# Patient Record
Sex: FEMALE | Race: WHITE | NOT HISPANIC OR LATINO | Employment: OTHER | URBAN - NONMETROPOLITAN AREA
[De-identification: names, ages, dates, MRNs, and addresses within clinical notes are randomized per-mention and may not be internally consistent; named-entity substitution may affect disease eponyms.]

---

## 2017-06-05 ENCOUNTER — OFFICE VISIT (OUTPATIENT)
Dept: GASTROENTEROLOGY | Facility: CLINIC | Age: 67
End: 2017-06-05

## 2017-06-05 VITALS
BODY MASS INDEX: 27.94 KG/M2 | DIASTOLIC BLOOD PRESSURE: 73 MMHG | SYSTOLIC BLOOD PRESSURE: 143 MMHG | OXYGEN SATURATION: 92 % | WEIGHT: 178 LBS | HEART RATE: 69 BPM | HEIGHT: 67 IN

## 2017-06-05 DIAGNOSIS — R10.84 GENERALIZED ABDOMINAL PAIN: Primary | ICD-10-CM

## 2017-06-05 DIAGNOSIS — Z12.11 COLON CANCER SCREENING: ICD-10-CM

## 2017-06-05 DIAGNOSIS — R11.2 NAUSEA AND VOMITING, INTRACTABILITY OF VOMITING NOT SPECIFIED, UNSPECIFIED VOMITING TYPE: ICD-10-CM

## 2017-06-05 PROCEDURE — 99204 OFFICE O/P NEW MOD 45 MIN: CPT | Performed by: INTERNAL MEDICINE

## 2017-06-05 RX ORDER — LEVOTHYROXINE SODIUM 0.1 MG/1
100 TABLET ORAL DAILY
COMMUNITY

## 2017-06-26 RX ORDER — DULOXETIN HYDROCHLORIDE 60 MG/1
60 CAPSULE, DELAYED RELEASE ORAL DAILY
COMMUNITY

## 2017-06-26 RX ORDER — BACLOFEN 10 MG/1
10 TABLET ORAL 3 TIMES DAILY
COMMUNITY

## 2017-06-26 RX ORDER — ATENOLOL 50 MG/1
50 TABLET ORAL DAILY
COMMUNITY

## 2017-06-26 RX ORDER — HYDROCODONE BITARTRATE AND ACETAMINOPHEN 10; 325 MG/1; MG/1
1 TABLET ORAL EVERY 6 HOURS PRN
COMMUNITY

## 2017-06-26 RX ORDER — HYDROXYZINE HYDROCHLORIDE 10 MG/1
10 TABLET, FILM COATED ORAL 3 TIMES DAILY PRN
COMMUNITY

## 2017-06-26 RX ORDER — DEXTROAMPHETAMINE SULFATE 30 MG/1
TABLET ORAL
COMMUNITY

## 2017-06-26 RX ORDER — LISINOPRIL 10 MG/1
10 TABLET ORAL DAILY
COMMUNITY

## 2017-06-26 RX ORDER — AMLODIPINE BESYLATE 5 MG/1
5 TABLET ORAL DAILY
COMMUNITY

## 2017-06-26 RX ORDER — ESCITALOPRAM OXALATE 10 MG/1
10 TABLET ORAL DAILY
COMMUNITY

## 2017-07-11 ENCOUNTER — HOSPITAL ENCOUNTER (OUTPATIENT)
Facility: HOSPITAL | Age: 67
Setting detail: HOSPITAL OUTPATIENT SURGERY
Discharge: HOME | End: 2017-07-11
Attending: INTERNAL MEDICINE | Admitting: INTERNAL MEDICINE

## 2017-07-11 ENCOUNTER — ANESTHESIA (OUTPATIENT)
Dept: PERIOP | Facility: HOSPITAL | Age: 67
End: 2017-07-11

## 2017-07-11 ENCOUNTER — ANESTHESIA EVENT (OUTPATIENT)
Dept: PERIOP | Facility: HOSPITAL | Age: 67
End: 2017-07-11

## 2017-07-11 VITALS
TEMPERATURE: 97 F | SYSTOLIC BLOOD PRESSURE: 159 MMHG | DIASTOLIC BLOOD PRESSURE: 77 MMHG | BODY MASS INDEX: 25.58 KG/M2 | RESPIRATION RATE: 20 BRPM | WEIGHT: 163 LBS | HEIGHT: 67 IN | OXYGEN SATURATION: 96 % | HEART RATE: 58 BPM

## 2017-07-11 DIAGNOSIS — R11.2 NAUSEA AND VOMITING, INTRACTABILITY OF VOMITING NOT SPECIFIED, UNSPECIFIED VOMITING TYPE: ICD-10-CM

## 2017-07-11 DIAGNOSIS — Z12.11 COLON CANCER SCREENING: ICD-10-CM

## 2017-07-11 DIAGNOSIS — R10.84 GENERALIZED ABDOMINAL PAIN: ICD-10-CM

## 2017-07-11 PROCEDURE — 25010000002 FENTANYL CITRATE (PF) 100 MCG/2ML SOLUTION: Performed by: NURSE ANESTHETIST, CERTIFIED REGISTERED

## 2017-07-11 PROCEDURE — 25010000002 MIDAZOLAM PER 1 MG: Performed by: NURSE ANESTHETIST, CERTIFIED REGISTERED

## 2017-07-11 PROCEDURE — 25010000002 PROPOFOL 10 MG/ML EMULSION: Performed by: NURSE ANESTHETIST, CERTIFIED REGISTERED

## 2017-07-11 PROCEDURE — 45385 COLONOSCOPY W/LESION REMOVAL: CPT | Performed by: INTERNAL MEDICINE

## 2017-07-11 PROCEDURE — 88305 TISSUE EXAM BY PATHOLOGIST: CPT | Performed by: INTERNAL MEDICINE

## 2017-07-11 PROCEDURE — 43239 EGD BIOPSY SINGLE/MULTIPLE: CPT | Performed by: INTERNAL MEDICINE

## 2017-07-11 PROCEDURE — 25010000002 PROPOFOL 1000 MG/ML EMULSION: Performed by: NURSE ANESTHETIST, CERTIFIED REGISTERED

## 2017-07-11 RX ORDER — PROPOFOL 10 MG/ML
VIAL (ML) INTRAVENOUS AS NEEDED
Status: DISCONTINUED | OUTPATIENT
Start: 2017-07-11 | End: 2017-07-11 | Stop reason: SURG

## 2017-07-11 RX ORDER — IPRATROPIUM BROMIDE AND ALBUTEROL SULFATE 2.5; .5 MG/3ML; MG/3ML
3 SOLUTION RESPIRATORY (INHALATION) ONCE AS NEEDED
Status: DISCONTINUED | OUTPATIENT
Start: 2017-07-11 | End: 2017-07-11 | Stop reason: HOSPADM

## 2017-07-11 RX ORDER — SODIUM CHLORIDE, SODIUM LACTATE, POTASSIUM CHLORIDE, CALCIUM CHLORIDE 600; 310; 30; 20 MG/100ML; MG/100ML; MG/100ML; MG/100ML
125 INJECTION, SOLUTION INTRAVENOUS CONTINUOUS
Status: DISCONTINUED | OUTPATIENT
Start: 2017-07-11 | End: 2017-07-11 | Stop reason: HOSPADM

## 2017-07-11 RX ORDER — SODIUM CHLORIDE 0.9 % (FLUSH) 0.9 %
1-10 SYRINGE (ML) INJECTION AS NEEDED
Status: DISCONTINUED | OUTPATIENT
Start: 2017-07-11 | End: 2017-07-11 | Stop reason: HOSPADM

## 2017-07-11 RX ORDER — FENTANYL CITRATE 50 UG/ML
INJECTION, SOLUTION INTRAMUSCULAR; INTRAVENOUS AS NEEDED
Status: DISCONTINUED | OUTPATIENT
Start: 2017-07-11 | End: 2017-07-11 | Stop reason: SURG

## 2017-07-11 RX ORDER — MIDAZOLAM HYDROCHLORIDE 1 MG/ML
INJECTION INTRAMUSCULAR; INTRAVENOUS AS NEEDED
Status: DISCONTINUED | OUTPATIENT
Start: 2017-07-11 | End: 2017-07-11 | Stop reason: SURG

## 2017-07-11 RX ORDER — ONDANSETRON 2 MG/ML
4 INJECTION INTRAMUSCULAR; INTRAVENOUS ONCE AS NEEDED
Status: DISCONTINUED | OUTPATIENT
Start: 2017-07-11 | End: 2017-07-11 | Stop reason: HOSPADM

## 2017-07-11 RX ORDER — OXYCODONE HYDROCHLORIDE AND ACETAMINOPHEN 5; 325 MG/1; MG/1
1 TABLET ORAL ONCE AS NEEDED
Status: DISCONTINUED | OUTPATIENT
Start: 2017-07-11 | End: 2017-07-11 | Stop reason: HOSPADM

## 2017-07-11 RX ORDER — FENTANYL CITRATE 50 UG/ML
50 INJECTION, SOLUTION INTRAMUSCULAR; INTRAVENOUS
Status: DISCONTINUED | OUTPATIENT
Start: 2017-07-11 | End: 2017-07-11 | Stop reason: HOSPADM

## 2017-07-11 RX ORDER — LIDOCAINE HYDROCHLORIDE 20 MG/ML
INJECTION, SOLUTION INFILTRATION; PERINEURAL AS NEEDED
Status: DISCONTINUED | OUTPATIENT
Start: 2017-07-11 | End: 2017-07-11 | Stop reason: SURG

## 2017-07-11 RX ORDER — MEPERIDINE HYDROCHLORIDE 25 MG/ML
12.5 INJECTION INTRAMUSCULAR; INTRAVENOUS; SUBCUTANEOUS
Status: DISCONTINUED | OUTPATIENT
Start: 2017-07-11 | End: 2017-07-11 | Stop reason: HOSPADM

## 2017-07-11 RX ADMIN — PROPOFOL 20 MG: 10 INJECTION, EMULSION INTRAVENOUS at 09:43

## 2017-07-11 RX ADMIN — LIDOCAINE HYDROCHLORIDE 60 MG: 20 INJECTION, SOLUTION INFILTRATION; PERINEURAL at 09:43

## 2017-07-11 RX ADMIN — MIDAZOLAM HYDROCHLORIDE 2 MG: 1 INJECTION, SOLUTION INTRAMUSCULAR; INTRAVENOUS at 09:39

## 2017-07-11 RX ADMIN — SODIUM CHLORIDE, POTASSIUM CHLORIDE, SODIUM LACTATE AND CALCIUM CHLORIDE 125 ML/HR: 600; 310; 30; 20 INJECTION, SOLUTION INTRAVENOUS at 08:27

## 2017-07-11 RX ADMIN — PROPOFOL 140 MCG/KG/MIN: 10 INJECTION, EMULSION INTRAVENOUS at 09:43

## 2017-07-11 RX ADMIN — FENTANYL CITRATE 100 MCG: 50 INJECTION INTRAMUSCULAR; INTRAVENOUS at 09:39

## 2017-07-12 ENCOUNTER — TELEPHONE (OUTPATIENT)
Dept: GASTROENTEROLOGY | Facility: CLINIC | Age: 67
End: 2017-07-12

## 2017-07-12 DIAGNOSIS — R10.84 GENERALIZED ABDOMINAL PAIN: Primary | ICD-10-CM

## 2017-07-12 LAB
LAB AP CASE REPORT: NORMAL
Lab: NORMAL
PATH REPORT.FINAL DX SPEC: NORMAL

## 2017-09-18 ENCOUNTER — TELEPHONE (OUTPATIENT)
Dept: GASTROENTEROLOGY | Facility: CLINIC | Age: 67
End: 2017-09-18

## 2017-09-18 DIAGNOSIS — R10.84 GENERALIZED ABDOMINAL PAIN: Primary | ICD-10-CM

## 2017-09-19 ENCOUNTER — TELEPHONE (OUTPATIENT)
Dept: GASTROENTEROLOGY | Facility: CLINIC | Age: 67
End: 2017-09-19

## 2017-09-19 ENCOUNTER — HOSPITAL ENCOUNTER (OUTPATIENT)
Dept: ULTRASOUND IMAGING | Facility: HOSPITAL | Age: 67
Discharge: HOME | End: 2017-09-19
Attending: INTERNAL MEDICINE | Admitting: INTERNAL MEDICINE

## 2017-09-19 DIAGNOSIS — R93.5 ABNORMAL ULTRASOUND OF ABDOMEN: Primary | ICD-10-CM

## 2017-09-19 PROCEDURE — 76700 US EXAM ABDOM COMPLETE: CPT | Performed by: RADIOLOGY

## 2017-09-19 PROCEDURE — 76700 US EXAM ABDOM COMPLETE: CPT

## 2017-09-22 ENCOUNTER — LAB (OUTPATIENT)
Dept: LAB | Facility: HOSPITAL | Age: 67
End: 2017-09-22
Attending: INTERNAL MEDICINE

## 2017-09-22 DIAGNOSIS — R93.5 ABNORMAL ULTRASOUND OF ABDOMEN: ICD-10-CM

## 2017-09-22 LAB
ALBUMIN SERPL-MCNC: 4.3 G/DL (ref 3.4–4.8)
ALP SERPL-CCNC: 96 U/L (ref 35–104)
ALT SERPL W P-5'-P-CCNC: 14 U/L (ref 10–36)
AST SERPL-CCNC: 19 U/L (ref 10–30)
BILIRUB CONJ SERPL-MCNC: 0 MG/DL (ref 0–0.2)
BILIRUB INDIRECT SERPL-MCNC: 0.3 MG/DL
BILIRUB SERPL-MCNC: 0.3 MG/DL (ref 0.2–1.8)
PROT SERPL-MCNC: 6.8 G/DL (ref 6–8)

## 2017-09-22 PROCEDURE — 80076 HEPATIC FUNCTION PANEL: CPT | Performed by: INTERNAL MEDICINE

## 2021-02-17 DIAGNOSIS — Z23 IMMUNIZATION DUE: ICD-10-CM

## 2025-05-13 PROBLEM — I65.29 CAROTID STENOSIS: Status: ACTIVE | Noted: 2025-05-13

## 2025-05-13 PROBLEM — I10 ESSENTIAL HYPERTENSION: Status: ACTIVE | Noted: 2025-05-13

## 2025-05-13 NOTE — PROGRESS NOTES
"[facility]  Cardiology Consultation Note     Stephanie Loza  1950  Requesting Provider: ISABEL Brannon  PCP: Flores Del Castillo APRN    ID:  Stephanie Loza is a 74 y.o. female who resides in [place], [place].     REASON FOR CONSULTATION:    Hypertension         Dear Flores:    Thank you for referring Stephanie Loza to my office today for hypertension.  She actually thought she was here today to talk about her peripheral arterial disease and right leg claudication.  She has had multiple procedures performed in her distal aorta and right leg initially by Dr. Nehemiah Pastrana and most recently by Dr. العلي at [facility].  She states that she has had recurrence of claudication symptoms in her right leg identical to what she was experiencing prior to previous right lower extremity revascularization procedure.    The patient states her blood pressure is typically \"good\" but does not check it at home.  She is presently on lisinopril HCTZ, amlodipine, and atenolol.    He is a non-smoker.  She is compliant with statin therapy and her recent lipid profile was excellent.      Past Medical History, Past Surgical History, Family history, Social History, and Medications were all reviewed with the patient today and updated as necessary.       Current Outpatient Medications:     amLODIPine (NORVASC) 5 MG tablet, Take 1 tablet by mouth Daily., Disp: , Rfl:     atenolol (TENORMIN) 50 MG tablet, Take 1 tablet by mouth Daily., Disp: , Rfl:     butalbital-acetaminophen-caffeine (FIORICET, ESGIC) -40 MG per tablet, (-40 MG), Disp: , Rfl:     clonazePAM (KlonoPIN) 0.5 MG tablet, Take 1 tablet by mouth Daily., Disp: , Rfl:     clopidogrel (PLAVIX) 75 MG tablet, Take 1 tablet by mouth Daily., Disp: , Rfl:     DULoxetine (CYMBALTA) 60 MG capsule, Take 1 capsule by mouth Daily., Disp: , Rfl:     estradiol (ESTRACE) 0.5 MG tablet, Take 1 tablet by mouth Daily., Disp: , Rfl:     " "HYDROcodone-acetaminophen (NORCO)  MG per tablet, Take 1 tablet by mouth Every 6 (Six) Hours As Needed for Moderate Pain., Disp: , Rfl:     levothyroxine (SYNTHROID, LEVOTHROID) 100 MCG tablet, Take 1 tablet by mouth Daily., Disp: , Rfl:     lisinopril-hydrochlorothiazide (PRINZIDE,ZESTORETIC) 20-25 MG per tablet, Take 1 tablet by mouth Daily., Disp: , Rfl:     rosuvastatin (CRESTOR) 20 MG tablet, Take 1 tablet by mouth Daily., Disp: , Rfl:     Allergies   Allergen Reactions    Iodinated Contrast Media Hives         Past Medical History:   Diagnosis Date    Arthritis     Brain condition     Coronary artery disease     Disease of thyroid gland     Fibromyalgia     Headache     Hypertension        Past Surgical History:   Procedure Laterality Date    CHOLECYSTECTOMY      COLONOSCOPY N/A 7/11/2017    Procedure: COLONOSCOPY  CPTCODE:92778;  Surgeon: Phong Montoya III, MD;  Location: [facility];  Service:     ENDOSCOPY N/A 7/11/2017    Procedure: ESOPHAGOGASTRODUODENOSCOPY WITH BIOPSY  CPTCODE:78643;  Surgeon: Phong Montoya III, MD;  Location: [facility];  Service:     FEMORAL ARTERY STENT      HYSTERECTOMY         Family History   Problem Relation Age of Onset    Heart disease Other     Diabetes Other     Cancer Other        Social History     Tobacco Use    Smoking status: Never    Smokeless tobacco: Not on file   Substance Use Topics    Alcohol use: No       Review of Systems   Cardiovascular:  Positive for claudication. Negative for chest pain and dyspnea on exertion.               /70   Pulse 59   Ht 167.6 cm (66\")   Wt 75.3 kg (166 lb)   SpO2 95%   BMI 26.79 kg/m²        Constitutional:       Appearance: Healthy appearance.   Eyes:      General: No scleral icterus.  Neck:      Thyroid: No thyroid mass.      Vascular: No carotid bruit or JVD. JVD normal.   Pulmonary:      Effort: Pulmonary effort is normal.      Breath sounds: Normal breath sounds.   Cardiovascular:      Normal rate. " Regular rhythm.      Murmurs: There is no murmur.      No gallop.    Edema:     Peripheral edema absent.   Skin:     General: Skin is warm. There is no cyanosis.   Neurological:      General: No focal deficit present.      Mental Status: Alert.   Psychiatric:         Attention and Perception: Attention normal.             ECG 12 Lead    Date/Time: 5/14/2025 2:49 PM  Performed by: James Galvez IV, MD    Authorized by: James Galvez IV, MD  Comparison: not compared with previous ECG   Previous ECG: no previous ECG available  Rhythm: sinus rhythm  BPM: 59  Other findings: non-specific ST-T wave changes    Clinical impression: non-specific ECG          Labs (4/22/2025):    HA1C 5.7  Chol 132, Trig 385, HDL 31, LDL 23    Labs (2/26/2024):    Glucose 91, creat 1.3, BUN 32, Na 137, K 3.8, AST 16, ALT 15         Diagnoses and all orders for this visit:    1. Essential hypertension (Primary)  Overview:  Target blood pressure <130/80 mmHg    Assessment & Plan:  Well-controlled  Continue lisinopril-HCTZ, amlodipine, and atenolol    Orders:  -     amLODIPine (NORVASC) 5 MG tablet; Take 1 tablet by mouth Daily.  -     atenolol (TENORMIN) 50 MG tablet; Take 1 tablet by mouth Daily.  -     lisinopril-hydrochlorothiazide (PRINZIDE,ZESTORETIC) 20-25 MG per tablet; Take 1 tablet by mouth Daily.    2. PAD (peripheral artery disease)  Overview:  Previous intervention by Dr. Pastrana and then [facility] -- Dr. العلي    Assessment & Plan:  Recurrent right lower extremity claudication symptoms  I recommend patient follow-up with Dr. العلي, who has previously cared for her peripheral arterial disease  Continue antiplatelet, high intensity statin    Orders:  -     clopidogrel (PLAVIX) 75 MG tablet; Take 1 tablet by mouth Daily.    3. Bilateral carotid artery stenosis  Overview:  Carotid duplex (7/20/2023): ARELIS 50-69% stenosis. LICA 50-69% stenosis.     Assessment & Plan:  Asymptomatic  Continue clopidogrel  and high intensity statin    Orders:  -     clopidogrel (PLAVIX) 75 MG tablet; Take 1 tablet by mouth Daily.    4. Hyperlipidemia LDL goal <50  Assessment & Plan:  LDL well-controlled  Continue rosuvastatin    Orders:  -     rosuvastatin (CRESTOR) 20 MG tablet; Take 1 tablet by mouth Daily.                 Continue present medical therapy  Patient should follow-up with Dr. العلي at [facility] for right leg claudication  Return in about 1 year (around 5/14/2026).        RUSTY Galvez MD, FAC, [facility]  Interventional Cardiology  05/14/25  14:41 EDT

## 2025-05-14 ENCOUNTER — OFFICE VISIT (OUTPATIENT)
Dept: CARDIOLOGY | Facility: CLINIC | Age: 75
End: 2025-05-14
Payer: MEDICARE

## 2025-05-14 VITALS
SYSTOLIC BLOOD PRESSURE: 130 MMHG | HEIGHT: 66 IN | BODY MASS INDEX: 26.68 KG/M2 | HEART RATE: 59 BPM | WEIGHT: 166 LBS | DIASTOLIC BLOOD PRESSURE: 70 MMHG | OXYGEN SATURATION: 95 %

## 2025-05-14 DIAGNOSIS — I73.9 PAD (PERIPHERAL ARTERY DISEASE): ICD-10-CM

## 2025-05-14 DIAGNOSIS — I10 ESSENTIAL HYPERTENSION: Primary | ICD-10-CM

## 2025-05-14 DIAGNOSIS — E78.5 HYPERLIPIDEMIA LDL GOAL <50: ICD-10-CM

## 2025-05-14 DIAGNOSIS — I65.23 BILATERAL CAROTID ARTERY STENOSIS: ICD-10-CM

## 2025-05-14 PROCEDURE — 1159F MED LIST DOCD IN RCRD: CPT | Performed by: INTERNAL MEDICINE

## 2025-05-14 PROCEDURE — 3078F DIAST BP <80 MM HG: CPT | Performed by: INTERNAL MEDICINE

## 2025-05-14 PROCEDURE — 1160F RVW MEDS BY RX/DR IN RCRD: CPT | Performed by: INTERNAL MEDICINE

## 2025-05-14 PROCEDURE — 3075F SYST BP GE 130 - 139MM HG: CPT | Performed by: INTERNAL MEDICINE

## 2025-05-14 RX ORDER — BUTALBITAL, ACETAMINOPHEN AND CAFFEINE 50; 325; 40 MG/1; MG/1; MG/1
TABLET ORAL
COMMUNITY
Start: 2024-12-03

## 2025-05-14 RX ORDER — CLOPIDOGREL BISULFATE 75 MG/1
75 TABLET ORAL DAILY
COMMUNITY
Start: 2025-04-22 | End: 2025-05-14 | Stop reason: SDUPTHER

## 2025-05-14 RX ORDER — AMLODIPINE BESYLATE 5 MG/1
5 TABLET ORAL DAILY
Start: 2025-05-14

## 2025-05-14 RX ORDER — CLONAZEPAM 0.5 MG/1
0.5 TABLET ORAL DAILY
COMMUNITY
Start: 2025-04-22 | End: 2025-05-20

## 2025-05-14 RX ORDER — ATENOLOL 50 MG/1
50 TABLET ORAL DAILY
Start: 2025-05-14

## 2025-05-14 RX ORDER — LISINOPRIL 10 MG/1
10 TABLET ORAL DAILY
Status: CANCELLED
Start: 2025-05-14

## 2025-05-14 RX ORDER — CLOPIDOGREL BISULFATE 75 MG/1
75 TABLET ORAL DAILY
Start: 2025-05-14

## 2025-05-14 RX ORDER — ROSUVASTATIN CALCIUM 20 MG/1
20 TABLET, COATED ORAL DAILY
COMMUNITY
End: 2025-05-14 | Stop reason: SDUPTHER

## 2025-05-14 RX ORDER — LISINOPRIL AND HYDROCHLOROTHIAZIDE 20; 25 MG/1; MG/1
1 TABLET ORAL DAILY
Start: 2025-05-14

## 2025-05-14 RX ORDER — ROSUVASTATIN CALCIUM 20 MG/1
20 TABLET, COATED ORAL DAILY
Start: 2025-05-14

## 2025-05-14 RX ORDER — LISINOPRIL AND HYDROCHLOROTHIAZIDE 20; 25 MG/1; MG/1
1 TABLET ORAL DAILY
COMMUNITY
End: 2025-05-14 | Stop reason: SDUPTHER

## 2025-05-14 RX ORDER — ESTRADIOL 0.5 MG/1
0.5 TABLET ORAL DAILY
COMMUNITY
Start: 2025-04-22 | End: 2025-07-21

## 2025-05-14 NOTE — LETTER
"May 14, 2025     ISABEL Brannon  [address]  [facility] [address]    Patient: Stephanie Loza   YOB: 1950   Date of Visit: 5/14/2025     Dear ISABEL Brannon:       Thank you for referring Stephanie Loza to me for evaluation. Below are the relevant portions of my assessment and plan of care.    If you have questions, please do not hesitate to call me. I look forward to following Stephanie along with you.         Sincerely,        James Galvez IV, MD        CC: No Recipients    James Galvez IV, MD  05/14/25 1449  Addendum  [facility]  Cardiology Consultation Note     Stephanie Loza  1950  Requesting Provider: ISABEL Brannon  PCP: Flores Del Castillo APRN    ID:  Stephanie Loza is a 74 y.o. female who resides in [place], [place].     REASON FOR CONSULTATION:    Hypertension         Dear Flores:    Thank you for referring Stephanie Loza to my office today for hypertension.  She actually thought she was here today to talk about her peripheral arterial disease and right leg claudication.  She has had multiple procedures performed in her distal aorta and right leg initially by Dr. Nehemiah Pastrana and most recently by Dr. العلي at [facility].  She states that she has had recurrence of claudication symptoms in her right leg identical to what she was experiencing prior to previous right lower extremity revascularization procedure.    The patient states her blood pressure is typically \"good\" but does not check it at home.  She is presently on lisinopril HCTZ, amlodipine, and atenolol.    He is a non-smoker.  She is compliant with statin therapy and her recent lipid profile was excellent.      Past Medical History, Past Surgical History, Family history, Social History, and Medications were all reviewed with the patient today and updated as necessary.       Current Outpatient Medications:   •  amLODIPine (NORVASC) 5 MG " tablet, Take 1 tablet by mouth Daily., Disp: , Rfl:   •  atenolol (TENORMIN) 50 MG tablet, Take 1 tablet by mouth Daily., Disp: , Rfl:   •  butalbital-acetaminophen-caffeine (FIORICET, ESGIC) -40 MG per tablet, (-40 MG), Disp: , Rfl:   •  clonazePAM (KlonoPIN) 0.5 MG tablet, Take 1 tablet by mouth Daily., Disp: , Rfl:   •  clopidogrel (PLAVIX) 75 MG tablet, Take 1 tablet by mouth Daily., Disp: , Rfl:   •  DULoxetine (CYMBALTA) 60 MG capsule, Take 1 capsule by mouth Daily., Disp: , Rfl:   •  estradiol (ESTRACE) 0.5 MG tablet, Take 1 tablet by mouth Daily., Disp: , Rfl:   •  HYDROcodone-acetaminophen (NORCO)  MG per tablet, Take 1 tablet by mouth Every 6 (Six) Hours As Needed for Moderate Pain., Disp: , Rfl:   •  levothyroxine (SYNTHROID, LEVOTHROID) 100 MCG tablet, Take 1 tablet by mouth Daily., Disp: , Rfl:   •  lisinopril-hydrochlorothiazide (PRINZIDE,ZESTORETIC) 20-25 MG per tablet, Take 1 tablet by mouth Daily., Disp: , Rfl:   •  rosuvastatin (CRESTOR) 20 MG tablet, Take 1 tablet by mouth Daily., Disp: , Rfl:     Allergies   Allergen Reactions   • Iodinated Contrast Media Hives         Past Medical History:   Diagnosis Date   • Arthritis    • Brain condition    • Coronary artery disease    • Disease of thyroid gland    • Fibromyalgia    • Headache    • Hypertension        Past Surgical History:   Procedure Laterality Date   • CHOLECYSTECTOMY     • COLONOSCOPY N/A 7/11/2017    Procedure: COLONOSCOPY  CPTCODE:34918;  Surgeon: Phong Montoya III, MD;  Location: [facility] OR;  Service:    • ENDOSCOPY N/A 7/11/2017    Procedure: ESOPHAGOGASTRODUODENOSCOPY WITH BIOPSY  CPTCODE:78800;  Surgeon: Phong Montoya III, MD;  Location: [facility] OR;  Service:    • FEMORAL ARTERY STENT     • HYSTERECTOMY         Family History   Problem Relation Age of Onset   • Heart disease Other    • Diabetes Other    • Cancer Other        Social History     Tobacco Use   • Smoking status: Never   • Smokeless tobacco:  "Not on file   Substance Use Topics   • Alcohol use: No       Review of Systems   Cardiovascular:  Positive for claudication. Negative for chest pain and dyspnea on exertion.               /70   Pulse 59   Ht 167.6 cm (66\")   Wt 75.3 kg (166 lb)   SpO2 95%   BMI 26.79 kg/m²        Constitutional:       Appearance: Healthy appearance.   Eyes:      General: No scleral icterus.  Neck:      Thyroid: No thyroid mass.      Vascular: No carotid bruit or JVD. JVD normal.   Pulmonary:      Effort: Pulmonary effort is normal.      Breath sounds: Normal breath sounds.   Cardiovascular:      Normal rate. Regular rhythm.      Murmurs: There is no murmur.      No gallop.    Edema:     Peripheral edema absent.   Skin:     General: Skin is warm. There is no cyanosis.   Neurological:      General: No focal deficit present.      Mental Status: Alert.   Psychiatric:         Attention and Perception: Attention normal.             ECG 12 Lead    Date/Time: 5/14/2025 2:49 PM  Performed by: James Galvez IV, MD    Authorized by: James Galvez IV, MD  Comparison: not compared with previous ECG   Previous ECG: no previous ECG available  Rhythm: sinus rhythm  BPM: 59  Other findings: non-specific ST-T wave changes    Clinical impression: non-specific ECG          Labs (4/22/2025):    HA1C 5.7  Chol 132, Trig 385, HDL 31, LDL 23    Labs (2/26/2024):    Glucose 91, creat 1.3, BUN 32, Na 137, K 3.8, AST 16, ALT 15         Diagnoses and all orders for this visit:    1. Essential hypertension (Primary)  Overview:  Target blood pressure <130/80 mmHg    Assessment & Plan:  Well-controlled  Continue lisinopril-HCTZ, amlodipine, and atenolol    Orders:  -     amLODIPine (NORVASC) 5 MG tablet; Take 1 tablet by mouth Daily.  -     atenolol (TENORMIN) 50 MG tablet; Take 1 tablet by mouth Daily.  -     lisinopril-hydrochlorothiazide (PRINZIDE,ZESTORETIC) 20-25 MG per tablet; Take 1 tablet by mouth Daily.    2. PAD " (peripheral artery disease)  Overview:  Previous intervention by Dr. Pastrana and then [facility] -- Dr. العلي    Assessment & Plan:  Recurrent right lower extremity claudication symptoms  I recommend patient follow-up with Dr. العلي, who has previously cared for her peripheral arterial disease  Continue antiplatelet, high intensity statin    Orders:  -     clopidogrel (PLAVIX) 75 MG tablet; Take 1 tablet by mouth Daily.    3. Bilateral carotid artery stenosis  Overview:  Carotid duplex (7/20/2023): ARELIS 50-69% stenosis. LICA 50-69% stenosis.     Assessment & Plan:  Asymptomatic  Continue clopidogrel and high intensity statin    Orders:  -     clopidogrel (PLAVIX) 75 MG tablet; Take 1 tablet by mouth Daily.    4. Hyperlipidemia LDL goal <50  Assessment & Plan:  LDL well-controlled  Continue rosuvastatin    Orders:  -     rosuvastatin (CRESTOR) 20 MG tablet; Take 1 tablet by mouth Daily.                 Continue present medical therapy  Patient should follow-up with Dr. العلي at [facility] for right leg claudication  Return in about 1 year (around 5/14/2026).        RUSTY Galvez MD, FAC, [facility]  Interventional Cardiology  05/14/25  14:41 EDT

## 2025-05-14 NOTE — ASSESSMENT & PLAN NOTE
Recurrent right lower extremity claudication symptoms  I recommend patient follow-up with Dr. العلي, who has previously cared for her peripheral arterial disease  Continue antiplatelet, high intensity statin

## 2025-05-14 NOTE — LETTER
"May 14, 2025     ISABEL Brannon  [address]  [facility] [address]    Patient: Stephanie Loza   YOB: 1950   Date of Visit: 5/14/2025     Dear ISABEL Brannon:       Thank you for referring Stephanie Loza to me for evaluation. Below are the relevant portions of my assessment and plan of care.    If you have questions, please do not hesitate to call me. I look forward to following Stephanie along with you.         Sincerely,        James Galvez IV, MD        CC: No Recipients    James Galvez IV, MD  05/14/25 1449  Addendum  [facility]  Cardiology Consultation Note     Stephanie Loza  1950  Requesting Provider: ISABEL Brannon  PCP: Flores Del Castillo APRN    ID:  Stephanie Loza is a 74 y.o. female who resides in [place], [place].     REASON FOR CONSULTATION:    Hypertension         Dear Flores:    Thank you for referring Stephanie Loza to my office today for hypertension.  She actually thought she was here today to talk about her peripheral arterial disease and right leg claudication.  She has had multiple procedures performed in her distal aorta and right leg initially by Dr. Nehemiah Pastrana and most recently by Dr. العلي at [facility].  She states that she has had recurrence of claudication symptoms in her right leg identical to what she was experiencing prior to previous right lower extremity revascularization procedure.    The patient states her blood pressure is typically \"good\" but does not check it at home.  She is presently on lisinopril HCTZ, amlodipine, and atenolol.    He is a non-smoker.  She is compliant with statin therapy and her recent lipid profile was excellent.      Past Medical History, Past Surgical History, Family history, Social History, and Medications were all reviewed with the patient today and updated as necessary.       Current Outpatient Medications:   •  amLODIPine (NORVASC) 5 MG " tablet, Take 1 tablet by mouth Daily., Disp: , Rfl:   •  atenolol (TENORMIN) 50 MG tablet, Take 1 tablet by mouth Daily., Disp: , Rfl:   •  butalbital-acetaminophen-caffeine (FIORICET, ESGIC) -40 MG per tablet, (-40 MG), Disp: , Rfl:   •  clonazePAM (KlonoPIN) 0.5 MG tablet, Take 1 tablet by mouth Daily., Disp: , Rfl:   •  clopidogrel (PLAVIX) 75 MG tablet, Take 1 tablet by mouth Daily., Disp: , Rfl:   •  DULoxetine (CYMBALTA) 60 MG capsule, Take 1 capsule by mouth Daily., Disp: , Rfl:   •  estradiol (ESTRACE) 0.5 MG tablet, Take 1 tablet by mouth Daily., Disp: , Rfl:   •  HYDROcodone-acetaminophen (NORCO)  MG per tablet, Take 1 tablet by mouth Every 6 (Six) Hours As Needed for Moderate Pain., Disp: , Rfl:   •  levothyroxine (SYNTHROID, LEVOTHROID) 100 MCG tablet, Take 1 tablet by mouth Daily., Disp: , Rfl:   •  lisinopril-hydrochlorothiazide (PRINZIDE,ZESTORETIC) 20-25 MG per tablet, Take 1 tablet by mouth Daily., Disp: , Rfl:   •  rosuvastatin (CRESTOR) 20 MG tablet, Take 1 tablet by mouth Daily., Disp: , Rfl:     Allergies   Allergen Reactions   • Iodinated Contrast Media Hives         Past Medical History:   Diagnosis Date   • Arthritis    • Brain condition    • Coronary artery disease    • Disease of thyroid gland    • Fibromyalgia    • Headache    • Hypertension        Past Surgical History:   Procedure Laterality Date   • CHOLECYSTECTOMY     • COLONOSCOPY N/A 7/11/2017    Procedure: COLONOSCOPY  CPTCODE:51673;  Surgeon: Phong Montoya III, MD;  Location: [facility] OR;  Service:    • ENDOSCOPY N/A 7/11/2017    Procedure: ESOPHAGOGASTRODUODENOSCOPY WITH BIOPSY  CPTCODE:78360;  Surgeon: Phong Montoya III, MD;  Location: [facility] OR;  Service:    • FEMORAL ARTERY STENT     • HYSTERECTOMY         Family History   Problem Relation Age of Onset   • Heart disease Other    • Diabetes Other    • Cancer Other        Social History     Tobacco Use   • Smoking status: Never   • Smokeless tobacco:  "Not on file   Substance Use Topics   • Alcohol use: No       Review of Systems   Cardiovascular:  Positive for claudication. Negative for chest pain and dyspnea on exertion.               /70   Pulse 59   Ht 167.6 cm (66\")   Wt 75.3 kg (166 lb)   SpO2 95%   BMI 26.79 kg/m²        Constitutional:       Appearance: Healthy appearance.   Eyes:      General: No scleral icterus.  Neck:      Thyroid: No thyroid mass.      Vascular: No carotid bruit or JVD. JVD normal.   Pulmonary:      Effort: Pulmonary effort is normal.      Breath sounds: Normal breath sounds.   Cardiovascular:      Normal rate. Regular rhythm.      Murmurs: There is no murmur.      No gallop.    Edema:     Peripheral edema absent.   Skin:     General: Skin is warm. There is no cyanosis.   Neurological:      General: No focal deficit present.      Mental Status: Alert.   Psychiatric:         Attention and Perception: Attention normal.             ECG 12 Lead    Date/Time: 5/14/2025 2:49 PM  Performed by: James Galvez IV, MD    Authorized by: James Galvez IV, MD  Comparison: not compared with previous ECG   Previous ECG: no previous ECG available  Rhythm: sinus rhythm  BPM: 59  Other findings: non-specific ST-T wave changes    Clinical impression: non-specific ECG          Labs (4/22/2025):    HA1C 5.7  Chol 132, Trig 385, HDL 31, LDL 23    Labs (2/26/2024):    Glucose 91, creat 1.3, BUN 32, Na 137, K 3.8, AST 16, ALT 15         Diagnoses and all orders for this visit:    1. Essential hypertension (Primary)  Overview:  Target blood pressure <130/80 mmHg    Assessment & Plan:  Well-controlled  Continue lisinopril-HCTZ, amlodipine, and atenolol    Orders:  -     amLODIPine (NORVASC) 5 MG tablet; Take 1 tablet by mouth Daily.  -     atenolol (TENORMIN) 50 MG tablet; Take 1 tablet by mouth Daily.  -     lisinopril-hydrochlorothiazide (PRINZIDE,ZESTORETIC) 20-25 MG per tablet; Take 1 tablet by mouth Daily.    2. PAD " (peripheral artery disease)  Overview:  Previous intervention by Dr. Pastrana and then [facility] -- Dr. العلي    Assessment & Plan:  Recurrent right lower extremity claudication symptoms  I recommend patient follow-up with Dr. العلي, who has previously cared for her peripheral arterial disease  Continue antiplatelet, high intensity statin    Orders:  -     clopidogrel (PLAVIX) 75 MG tablet; Take 1 tablet by mouth Daily.    3. Bilateral carotid artery stenosis  Overview:  Carotid duplex (7/20/2023): ARELIS 50-69% stenosis. LICA 50-69% stenosis.     Assessment & Plan:  Asymptomatic  Continue clopidogrel and high intensity statin    Orders:  -     clopidogrel (PLAVIX) 75 MG tablet; Take 1 tablet by mouth Daily.    4. Hyperlipidemia LDL goal <50  Assessment & Plan:  LDL well-controlled  Continue rosuvastatin    Orders:  -     rosuvastatin (CRESTOR) 20 MG tablet; Take 1 tablet by mouth Daily.                 Continue present medical therapy  Patient should follow-up with Dr. العلي at [facility] for right leg claudication  Return in about 1 year (around 5/14/2026).        RUSTY Galvez MD, FAC, [facility]  Interventional Cardiology  05/14/25  14:41 EDT    I have personally performed a face to face diagnostic evaluation on this patient. I have reviewed the ACP note and agree with the history, exam and plan of care, except as noted.

## 2025-05-20 ENCOUNTER — TELEPHONE (OUTPATIENT)
Dept: CARDIOLOGY | Facility: CLINIC | Age: 75
End: 2025-05-20

## 2025-05-20 NOTE — TELEPHONE ENCOUNTER
[facility] received a fax that requires your attention. The document has been indexed to the patient’s chart for your review.      Reason for sending: EXTERNAL MEDICAL RECORD NOTIFICATION     Documents Description: RECORDS, LISTED BELOW     Name of Sender: [facility]     Date Indexed:   TELEPHONE ENCOUNTER 1.21.25  CONSULT 1.22.25  PN 3.25.25  TELEPHONE ENCOUNTER 4.17.25  PN 1.23.25  LAB 2.24.25  ABSTRACT 2.26.25  PN 2.24.25  PT HIST 5.20.25  PN 4.22.25  LAB 4.22.25  PN 5.20.25

## (undated) DEVICE — THE BITE BLOCK MAXI, LATEX FREE STRAP IS USED TO PROTECT THE ENDOSCOPE INSERTION TUBE FROM BEING BITTEN BY THE PATIENT.

## (undated) DEVICE — CONN Y IRR DISP 1P/U

## (undated) DEVICE — Device: Brand: DEFENDO AIR/WATER/SUCTION AND BIOPSY VALVE

## (undated) DEVICE — SYR LUERLOK 30CC

## (undated) DEVICE — SNAR POLYP CAPTIFLX MICRO OVL 13MM 240CM

## (undated) DEVICE — Device

## (undated) DEVICE — THE SINGLE USE ETRAP – POLYP TRAP IS USED FOR SUCTION RETRIEVAL OF ENDOSCOPICALLY REMOVED POLYPS.: Brand: ETRAP

## (undated) DEVICE — TUBING, SUCTION, 1/4" X 20', STRAIGHT: Brand: MEDLINE INDUSTRIES, INC.

## (undated) DEVICE — Device: Brand: ENDOGATOR

## (undated) DEVICE — FRCP BX RADJAW4 NDL 2.8 240CM LG OG BX40

## (undated) DEVICE — SINGLE PORT MANIFOLD: Brand: NEPTUNE 2

## (undated) DEVICE — PAD E/S GRND SGL/FOIL 9FT/CORD DISP